# Patient Record
Sex: MALE | Race: WHITE | NOT HISPANIC OR LATINO | Employment: OTHER | URBAN - METROPOLITAN AREA
[De-identification: names, ages, dates, MRNs, and addresses within clinical notes are randomized per-mention and may not be internally consistent; named-entity substitution may affect disease eponyms.]

---

## 2017-06-09 ENCOUNTER — TELEPHONE (OUTPATIENT)
Dept: NEUROLOGY | Facility: HOSPITAL | Age: 57
End: 2017-06-09

## 2017-06-09 DIAGNOSIS — E83.119 HEMOCHROMATOSIS, UNSPECIFIED HEMOCHROMATOSIS TYPE: Primary | ICD-10-CM

## 2017-06-09 NOTE — TELEPHONE ENCOUNTER
----- Message from Corrine Alcazar sent at 6/9/2017  1:41 PM CDT -----  Contact: Patient  Gi- Patient states he has Hemochromatosis and  is long overdue for labs. He would like labs faxed to the Lab raul by [facility]. Patient would like a call when this is done at  [phone].

## 2017-06-09 NOTE — TELEPHONE ENCOUNTER
Pt requesting to have labs done to monitor hemochromatosis and additional labs to check liver because he's been feeling weak lately.  Message sent to Dr Dockery.

## 2017-06-15 ENCOUNTER — TELEPHONE (OUTPATIENT)
Dept: NEUROLOGY | Facility: HOSPITAL | Age: 57
End: 2017-06-15

## 2017-06-15 NOTE — TELEPHONE ENCOUNTER
Patient states he has to have labs done at [facility]. Will have Dr Dockery sign orders.

## 2017-06-15 NOTE — TELEPHONE ENCOUNTER
----- Message from Connie Saravia sent at 6/15/2017  1:11 PM CDT -----  Contact: Patient  GI- Patient needs to speak to Cathy. Patient would like to Cathy to call on his cell phone   [phone].

## 2017-06-19 LAB
EXT 24 HR UR METANEPHRINE: NORMAL
EXT 24 HR UR NORMETANEPHRINE: NORMAL
EXT 24 HR UR NORMETANEPHRINE: NORMAL
EXT 25 HYDROXY VIT D2: NORMAL
EXT 25 HYDROXY VIT D3: NORMAL
EXT 5 HIAA 24 HR URINE: NORMAL
EXT 5 HIAA BLOOD: NORMAL
EXT ACTH: NORMAL
EXT AFP: NORMAL
EXT ALBUMIN: 4.8 G/DL (ref 3.5–5.5)
EXT ALKALINE PHOSPHATASE: 84 IU/L (ref 39–117)
EXT ALT: 21 IU/L (ref 0–44)
EXT AMYLASE: NORMAL
EXT ANTI ISLET CELL AB: NORMAL
EXT ANTI PARIETAL CELL AB: NORMAL
EXT ANTI THYROID AB: NORMAL
EXT AST: 27 IU/L (ref 0–40)
EXT BILIRUBIN DIRECT: NORMAL
EXT BILIRUBIN TOTAL: 0.8 MG/DL (ref 0–1.2)
EXT BK VIRUS DNA QN PCR: NORMAL
EXT BUN: 14 MG/DL (ref 6–24)
EXT C PEPTIDE: NORMAL
EXT CA 125: NORMAL
EXT CA 19-9: NORMAL
EXT CA 27-29: NORMAL
EXT CALCITONIN: NORMAL
EXT CALCIUM: 9.4 MG/DL (ref 8.7–10.2)
EXT CEA: NORMAL
EXT CHLORIDE: 100 MMOL/L (ref 96–106)
EXT CHOLESTEROL: NORMAL
EXT CHROMOGRANIN A: NORMAL
EXT CO2: 24 MMOL/L (ref 18–29)
EXT CREATININE UA: NORMAL
EXT CREATININE: 0.93 MG/DL (ref 0.76–1.27)
EXT CYCLOSPORONE LEVEL: NORMAL
EXT DOPAMINE: NORMAL
EXT EBV DNA BY PCR: NORMAL
EXT EPINEPHRINE: NORMAL
EXT FOLATE: NORMAL
EXT FREE T3: NORMAL
EXT FREE T4: NORMAL
EXT FSH: NORMAL
EXT GASTRIN RELEASING PEPTIDE: NORMAL
EXT GASTRIN RELEASING PEPTIDE: NORMAL
EXT GASTRIN: NORMAL
EXT GGT: NORMAL
EXT GHRELIN: NORMAL
EXT GLUCAGON: NORMAL
EXT GLUCOSE: 99 MG/DL (ref 65–99)
EXT GROWTH HORMONE: NORMAL
EXT HCV RNA QUANT PCR: NORMAL
EXT HDL: NORMAL
EXT HEMATOCRIT: 47.9 % (ref 37.5–51)
EXT HEMOGLOBIN A1C: NORMAL
EXT HEMOGLOBIN: 16.5 G/DL (ref 12.6–17.7)
EXT HISTAMINE 24 HR URINE: NORMAL
EXT HISTAMINE: NORMAL
EXT IGF-1: NORMAL
EXT IMMUNKNOW (NON-STIMULATED): NORMAL
EXT IMMUNKNOW (STIMULATED): NORMAL
EXT INR: NORMAL
EXT INSULIN: NORMAL
EXT LANREOTIDE LEVEL: NORMAL
EXT LDH, TOTAL: NORMAL
EXT LDL CHOLESTEROL: NORMAL
EXT LIPASE: NORMAL
EXT MAGNESIUM: NORMAL
EXT METANEPHRINE FREE PLASMA: NORMAL
EXT MOTILIN: NORMAL
EXT NEUROKININ A CAMB: NORMAL
EXT NEUROKININ A ISI: NORMAL
EXT NEUROTENSIN: NORMAL
EXT NOREPINEPHRINE: NORMAL
EXT NORMETANEPHRINE: NORMAL
EXT NSE: NORMAL
EXT OCTREOTIDE LEVEL: NORMAL
EXT PANCREASTATIN CAMB: NORMAL
EXT PANCREASTATIN ISI: NORMAL
EXT PANCREATIC POLYPEPTIDE: NORMAL
EXT PHOSPHORUS: NORMAL
EXT PLATELETS: 220 X10E3/UL (ref 150–379)
EXT POTASSIUM: 4 MMOL/L (ref 3.5–5.2)
EXT PROGRAF LEVEL: NORMAL
EXT PROLACTIN: NORMAL
EXT PROTEIN TOTAL: 7.2 G/DL (ref 6–8.5)
EXT PROTEIN UA: NORMAL
EXT PT: NORMAL
EXT PTH, INTACT: NORMAL
EXT PTT: NORMAL
EXT RAPAMUNE LEVEL: NORMAL
EXT SEROTONIN: NORMAL
EXT SODIUM: 143 MMOL/L (ref 134–144)
EXT SOMATOSTATIN: NORMAL
EXT SUBSTANCE P: NORMAL
EXT TRIGLYCERIDES: NORMAL
EXT TRYPTASE: NORMAL
EXT TSH: NORMAL
EXT URIC ACID: NORMAL
EXT URINE AMYLASE U/HR: NORMAL
EXT URINE AMYLASE U/L: NORMAL
EXT VASOACTIVE INTESTINAL POLYPEPTIDE: NORMAL
EXT VITAMIN B12: NORMAL
EXT VMA 24 HR URINE: NORMAL
EXT WBC: 6.6 X10E3/UL (ref 3.4–10.8)
NEURON SPECIFIC ENOLASE: NORMAL

## 2017-06-26 ENCOUNTER — TELEPHONE (OUTPATIENT)
Dept: NEUROLOGY | Facility: HOSPITAL | Age: 57
End: 2017-06-26

## 2017-06-26 NOTE — TELEPHONE ENCOUNTER
----- Message from Corrine Alcazar sent at 6/26/2017  1:12 PM CDT -----  Contact: Patient  GI- Patient would like to speak to a nurse in regards to lab results. Patient can be reached at [phone].

## 2017-06-28 ENCOUNTER — TELEPHONE (OUTPATIENT)
Dept: NEUROLOGY | Facility: HOSPITAL | Age: 57
End: 2017-06-28

## 2017-06-28 NOTE — TELEPHONE ENCOUNTER
Pt states he has hemochromatosis, feels fatigued.  Pt request to have orders written for blood transfusion. Pt notified Dr. Dockery to be notified.

## 2017-06-29 ENCOUNTER — TELEPHONE (OUTPATIENT)
Dept: NEUROLOGY | Facility: HOSPITAL | Age: 57
End: 2017-06-29

## 2017-07-10 ENCOUNTER — OFFICE VISIT (OUTPATIENT)
Dept: NEUROLOGY | Facility: HOSPITAL | Age: 57
End: 2017-07-10
Attending: INTERNAL MEDICINE
Payer: COMMERCIAL

## 2017-07-10 VITALS
WEIGHT: 168 LBS | HEIGHT: 68 IN | BODY MASS INDEX: 25.46 KG/M2 | HEART RATE: 85 BPM | TEMPERATURE: 98 F | SYSTOLIC BLOOD PRESSURE: 150 MMHG | RESPIRATION RATE: 16 BRPM | DIASTOLIC BLOOD PRESSURE: 102 MMHG

## 2017-07-10 DIAGNOSIS — E83.119 HEMOCHROMATOSIS, UNSPECIFIED HEMOCHROMATOSIS TYPE: Primary | ICD-10-CM

## 2017-07-10 PROCEDURE — 99214 OFFICE O/P EST MOD 30 MIN: CPT | Performed by: INTERNAL MEDICINE

## 2017-07-10 NOTE — PROGRESS NOTES
"[facility]    CC:  hemochromatosis    HPI 56 y.o. male with a history significant for hemochromatosis, polypectomy (2005) and dysphagia 2/2 Schatzki ring s/p dilation. Patient states he has not gotten phlebotomy since a year ago secondary to stressors. He denies abdominal pain, nausea or vomiting. He denies chest pain or shortness of breat. He has regular bowel movements. He denies difficulty eating solid or liquid foods. He has muscle soreness, however he walks 5-6 miles a day. He denies fatigue or muscle weakness. He had gotten labs at [facility] and ferritin level was elevated.       Past Medical History:   Diagnosis Date    Hemochromatosis     Kidney stone     Urinary tract infection          Review of Systems  General ROS: negative for chills, fever or weight loss  Cardiovascular ROS: no chest pain or dyspnea on exertion      Physical Examination  BP (!) 150/102 Comment: repeat 136 90  Pulse 85   Temp 98 °F (36.7 °C) (Oral)   Resp 16   Ht 5' 8" (1.727 m)   Wt 76.2 kg (168 lb)   BMI 25.54 kg/m²   General appearance: alert, cooperative, no distress  HENT: Normocephalic, atraumatic, neck symmetrical, no nasal discharge   Lungs: clear to auscultation bilaterally, no dullness to percussion bilaterally  Heart: regular rate and rhythm without rub; no displacement of the PMI   Abdomen: soft, non-tender; bowel sounds normoactive; no organomegaly  Extremities: extremities symmetric; no clubbing, cyanosis, or edema    Labs:  H/H (6/2016): 15/42, MCV 94    Imaging:  CXR (6/24): no acute abnormalities     I have personally reviewed the above imaging.     Assessment:   Mr. Harris is a 56 year old gentleman with hemachromatosis     Plan:  Hemochromatosis  - Ferritin level obtained 3 weeks ago, elevated around 400  - Phlebotomy for 2 pints every two weeks for four times with repeat ferritin level afterwards.  - Goal ferritin < 200. PRN phlebotomy for a year to meet goal.     Kevin Dockery MD   [address], [address]   [place] [address]   [phone]

## 2018-06-05 ENCOUNTER — CLINICAL SUPPORT (OUTPATIENT)
Dept: OTHER | Facility: CLINIC | Age: 58
End: 2018-06-05
Payer: COMMERCIAL

## 2018-06-05 VITALS
DIASTOLIC BLOOD PRESSURE: 86 MMHG | WEIGHT: 170 LBS | SYSTOLIC BLOOD PRESSURE: 128 MMHG | HEIGHT: 68 IN | BODY MASS INDEX: 25.76 KG/M2

## 2018-06-05 DIAGNOSIS — Z00.8 HEALTH EXAMINATION IN POPULATION SURVEYS: Primary | ICD-10-CM

## 2018-06-05 LAB
GLUCOSE SERPL-MCNC: ABNORMAL MG/DL (ref 60–140)
POC CHOLESTEROL, HDL: 38 MG/DL (ref 40–?)
POC CHOLESTEROL, LDL: 151 MG/DL (ref ?–160)
POC CHOLESTEROL, TOTAL: 218 MG/DL (ref ?–240)
POC GLUCOSE FASTING: 104 MG/DL (ref 60–110)
POC TOTAL CHOLESTEROL / HDL RATIO: 5.74 (ref ?–6)
POC TRIGLYCERIDES: 145 MG/DL (ref ?–160)

## 2018-06-05 PROCEDURE — 82947 ASSAY GLUCOSE BLOOD QUANT: CPT | Mod: QW,S$GLB,, | Performed by: INTERNAL MEDICINE

## 2018-06-05 PROCEDURE — 99401 PREV MED CNSL INDIV APPRX 15: CPT | Mod: S$GLB,,, | Performed by: INTERNAL MEDICINE

## 2018-06-05 PROCEDURE — 80061 LIPID PANEL: CPT | Mod: QW,S$GLB,, | Performed by: INTERNAL MEDICINE

## 2018-08-16 LAB
EXT 24 HR UR METANEPHRINE: ABNORMAL
EXT 24 HR UR NORMETANEPHRINE: ABNORMAL
EXT 24 HR UR NORMETANEPHRINE: ABNORMAL
EXT 25 HYDROXY VIT D2: ABNORMAL
EXT 25 HYDROXY VIT D3: ABNORMAL
EXT 5 HIAA 24 HR URINE: ABNORMAL
EXT 5 HIAA BLOOD: ABNORMAL
EXT ACTH: ABNORMAL
EXT AFP: ABNORMAL
EXT ALBUMIN: 5 G/DL (ref 3.6–5.1)
EXT ALKALINE PHOSPHATASE: 92 U/L (ref 40–115)
EXT ALT: 22 U/L (ref 9–46)
EXT AMYLASE: ABNORMAL
EXT ANTI ISLET CELL AB: ABNORMAL
EXT ANTI PARIETAL CELL AB: ABNORMAL
EXT ANTI THYROID AB: ABNORMAL
EXT AST: 29 U/L (ref 10–35)
EXT BILIRUBIN DIRECT: ABNORMAL
EXT BILIRUBIN TOTAL: 1.6 MG/DL (ref 0.2–1.2)
EXT BK VIRUS DNA QN PCR: ABNORMAL
EXT BUN: 15 MG/DL (ref 7–25)
EXT C PEPTIDE: ABNORMAL
EXT CA 125: ABNORMAL
EXT CA 19-9: ABNORMAL
EXT CA 27-29: ABNORMAL
EXT CALCITONIN: ABNORMAL
EXT CALCIUM: 9.9 MG/DL (ref 8.6–10.3)
EXT CEA: ABNORMAL
EXT CHLORIDE: 101 MMOL/L (ref 98–110)
EXT CHOLESTEROL: 228 MG/DL (ref 0–200)
EXT CHROMOGRANIN A: ABNORMAL
EXT CO2: 30 MMOL/L (ref 20–32)
EXT CREATININE UA: ABNORMAL
EXT CREATININE: 1.01 MG/DL (ref 0.7–1.33)
EXT CYCLOSPORONE LEVEL: ABNORMAL
EXT DOPAMINE: ABNORMAL
EXT EBV DNA BY PCR: ABNORMAL
EXT EPINEPHRINE: ABNORMAL
EXT FOLATE: ABNORMAL
EXT FREE T3: ABNORMAL
EXT FREE T4: ABNORMAL
EXT FSH: ABNORMAL
EXT GASTRIN RELEASING PEPTIDE: ABNORMAL
EXT GASTRIN RELEASING PEPTIDE: ABNORMAL
EXT GASTRIN: ABNORMAL
EXT GGT: ABNORMAL
EXT GHRELIN: ABNORMAL
EXT GLUCAGON: ABNORMAL
EXT GLUCOSE: 89 MG/DL (ref 65–99)
EXT GROWTH HORMONE: ABNORMAL
EXT HCV RNA QUANT PCR: ABNORMAL
EXT HDL: 47 MG/DL
EXT HEMATOCRIT: 53.3 % (ref 38.5–50)
EXT HEMOGLOBIN A1C: ABNORMAL
EXT HEMOGLOBIN: 18.1 G/DL (ref 13.2–17.1)
EXT HISTAMINE 24 HR URINE: ABNORMAL
EXT HISTAMINE: ABNORMAL
EXT IGF-1: ABNORMAL
EXT IMMUNKNOW (NON-STIMULATED): ABNORMAL
EXT IMMUNKNOW (STIMULATED): ABNORMAL
EXT INR: ABNORMAL
EXT INSULIN: ABNORMAL
EXT LANREOTIDE LEVEL: ABNORMAL
EXT LDH, TOTAL: ABNORMAL
EXT LDL CHOLESTEROL: 149 MG/DL (ref 0–100)
EXT LIPASE: ABNORMAL
EXT MAGNESIUM: ABNORMAL
EXT METANEPHRINE FREE PLASMA: ABNORMAL
EXT MOTILIN: ABNORMAL
EXT NEUROKININ A CAMB: ABNORMAL
EXT NEUROKININ A ISI: ABNORMAL
EXT NEUROTENSIN: ABNORMAL
EXT NOREPINEPHRINE: ABNORMAL
EXT NORMETANEPHRINE: ABNORMAL
EXT NSE: ABNORMAL
EXT OCTREOTIDE LEVEL: ABNORMAL
EXT PANCREASTATIN CAMB: ABNORMAL
EXT PANCREASTATIN ISI: ABNORMAL
EXT PANCREATIC POLYPEPTIDE: ABNORMAL
EXT PHOSPHORUS: ABNORMAL
EXT PLATELETS: 211 1000/UL (ref 140–400)
EXT POTASSIUM: 4.4 MMOL/L (ref 3.5–5.3)
EXT PROGRAF LEVEL: ABNORMAL
EXT PROLACTIN: ABNORMAL
EXT PROTEIN TOTAL: 7.8 G/DL (ref 6.1–8.1)
EXT PROTEIN UA: ABNORMAL
EXT PT: ABNORMAL
EXT PTH, INTACT: ABNORMAL
EXT PTT: ABNORMAL
EXT RAPAMUNE LEVEL: ABNORMAL
EXT SEROTONIN: ABNORMAL
EXT SODIUM: 141 MMOL/L (ref 135–146)
EXT SOMATOSTATIN: ABNORMAL
EXT SUBSTANCE P: ABNORMAL
EXT TRIGLYCERIDES: 186 (ref ?–0)
EXT TRYPTASE: ABNORMAL
EXT TSH: ABNORMAL
EXT URIC ACID: ABNORMAL
EXT URINE AMYLASE U/HR: ABNORMAL
EXT URINE AMYLASE U/L: ABNORMAL
EXT VASOACTIVE INTESTINAL POLYPEPTIDE: ABNORMAL
EXT VITAMIN B12: ABNORMAL
EXT VMA 24 HR URINE: ABNORMAL
EXT WBC: 5.5 1000/UL (ref 3.8–10.8)
NEURON SPECIFIC ENOLASE: ABNORMAL

## 2018-08-20 ENCOUNTER — OFFICE VISIT (OUTPATIENT)
Dept: NEUROLOGY | Facility: HOSPITAL | Age: 58
End: 2018-08-20
Attending: INTERNAL MEDICINE
Payer: COMMERCIAL

## 2018-08-20 VITALS
TEMPERATURE: 99 F | HEART RATE: 86 BPM | BODY MASS INDEX: 25.27 KG/M2 | DIASTOLIC BLOOD PRESSURE: 82 MMHG | HEIGHT: 69 IN | WEIGHT: 170.63 LBS | SYSTOLIC BLOOD PRESSURE: 132 MMHG

## 2018-08-20 DIAGNOSIS — E83.119 HEMOCHROMATOSIS, UNSPECIFIED HEMOCHROMATOSIS TYPE: Primary | ICD-10-CM

## 2018-08-20 PROCEDURE — 99214 OFFICE O/P EST MOD 30 MIN: CPT | Performed by: INTERNAL MEDICINE

## 2018-08-20 RX ORDER — ASPIRIN 81 MG/1
81 TABLET ORAL DAILY
COMMUNITY

## 2018-08-20 NOTE — PATIENT INSTRUCTIONS
Colonoscopy scheduled on Friday, December 7, 2018 at [facility].  Staff from this center will contact you with necessary information.

## 2018-08-20 NOTE — PROGRESS NOTES
"[facility]    CC:  hemochromatosis     HPI 57 y.o. male with a history significant for hemochromatosis, colon polyps and dysphagia 2/2 Schatzki ring s/p dilation who presents for follow up of several years of chronic, persistent hemochromatosis without requiring serial phlebotomy sessions..     Patient has not been seen in approximately one year. He is due for colonoscopy due to history of colon polyps in the past. Last colonoscopy was performed in 2011.    Past Medical History:   Diagnosis Date    Hemochromatosis     Kidney stone     Urinary tract infection      Review of Systems  General ROS: negative for chills, fever or weight loss  Cardiovascular ROS: no chest pain or dyspnea on exertion  Gastrointestinal ROS: no abdominal pain, change in bowel habits, or black/ bloody stools    Physical Examination  /82 (BP Location: Right leg, Patient Position: Sitting, BP Method: Small (Automatic))   Pulse 86   Temp 98.7 °F (37.1 °C) (Oral)   Ht 5' 9" (1.753 m)   Wt 77.4 kg (170 lb 10.2 oz)   BMI 25.20 kg/m²   General appearance: alert, cooperative, no distress  Abdomen: soft, non-tender; bowel sounds normoactive; no organomegaly    Labs:  Last ferritin noted in 2009    Imaging:  NM myocardial perfusion study:  No evidence of reversible ischemia  LVEF 61%    Assessment:   56 y.o. male with a history significant for hemochromatosis, colon polyps and dysphagia 2/2 Schatzki ring s/p dilation who is here for follow up of hemochromatosis. If ferritin is elevated above 200, he will need phlebotomy prescribed as 2 units every month x 4 months He is in need of surveillance colonoscopy given his history of colon polyps and colonoscopy last in 2011.     Plan:  -Goal ferritin < 200.   -Plan surveillance colonoscopy given history of colon polyps. This exam should be covered under his preventive care benefits. I will plan to perform this exam at the [facility] endoscopy center [place] on December 7th around 12noon "         Kevin Dockery MD   [address], [address]   [place] [address]   [phone]

## 2018-08-31 ENCOUNTER — TELEPHONE (OUTPATIENT)
Dept: NEUROLOGY | Facility: HOSPITAL | Age: 58
End: 2018-08-31

## 2018-08-31 NOTE — TELEPHONE ENCOUNTER
Pt notified he will be contacted upon receipt of hct threshold range.  Pt acknowledged understanding.

## 2018-08-31 NOTE — TELEPHONE ENCOUNTER
----- Message from Connie Saravia sent at 8/31/2018  2:25 PM CDT -----  Contact: patient  GI- Patient called regarding his lab work that He dropped off. Call back number is [phone]

## 2018-09-05 ENCOUNTER — TELEPHONE (OUTPATIENT)
Dept: NEUROLOGY | Facility: HOSPITAL | Age: 58
End: 2018-09-05

## 2018-09-05 NOTE — TELEPHONE ENCOUNTER
----- Message from Kevin Dockery MD sent at 9/5/2018  9:14 AM CDT -----  Strange. I guess tell them the range is 36 to 55% Hct     ----- Message -----  From: Arcelia Hunt LPN  Sent: 8/31/2018   2:35 PM  To: Kevin Dockery MD    Lab requesting hct threshold range for phlebotomy.

## 2018-11-14 ENCOUNTER — TELEPHONE (OUTPATIENT)
Dept: NEUROLOGY | Facility: HOSPITAL | Age: 58
End: 2018-11-14

## 2018-11-14 NOTE — TELEPHONE ENCOUNTER
Pt notified colonoscopy scheduled on Friday, December 7, 2018 at [facility] in [place] with 630 arrival time. Pt has family medical issues and will be going out of town for a couple of weeks.  Pt will call clinic to reschedule in January 2019.

## 2018-11-14 NOTE — TELEPHONE ENCOUNTER
Spring notified pt canceled on 12/7/18 and will contact this clinic to reschedule in January 2019.  Spring acknowledged understanding.

## 2018-11-14 NOTE — TELEPHONE ENCOUNTER
----- Message from Corrine Alcazar sent at 11/14/2018 10:38 AM CST -----  Contact: Spring with Kim RAY  GI- Spring RAY called to inform the nurse that the patients Procedure will be on 12/7/18  And the time was moved to 7:30 am report time will be 7:00 am. Spring would like the nurse to call the patient to inform of changes. Spring can be reached at [phone], if needed.

## 2018-11-29 DIAGNOSIS — N63.0 BREAST MASS IN MALE: Primary | ICD-10-CM

## 2019-01-22 DIAGNOSIS — N63.0 BREAST MASS IN MALE: Primary | ICD-10-CM

## 2019-01-29 DIAGNOSIS — N63.0 BREAST MASS: Primary | ICD-10-CM

## 2019-05-14 ENCOUNTER — CLINICAL SUPPORT (OUTPATIENT)
Dept: OTHER | Facility: CLINIC | Age: 59
End: 2019-05-14
Payer: COMMERCIAL

## 2019-05-14 DIAGNOSIS — Z00.8 ENCOUNTER FOR OTHER GENERAL EXAMINATION: ICD-10-CM

## 2019-05-14 PROCEDURE — 82947 PR  ASSAY QUANTITATIVE,BLOOD GLUCOSE: ICD-10-PCS | Mod: QW,S$GLB,, | Performed by: INTERNAL MEDICINE

## 2019-05-14 PROCEDURE — 82947 ASSAY GLUCOSE BLOOD QUANT: CPT | Mod: QW,S$GLB,, | Performed by: INTERNAL MEDICINE

## 2019-05-14 PROCEDURE — 99401 PREV MED CNSL INDIV APPRX 15: CPT | Mod: S$GLB,,, | Performed by: INTERNAL MEDICINE

## 2019-05-14 PROCEDURE — 80061 LIPID PANEL: CPT | Mod: QW,S$GLB,, | Performed by: INTERNAL MEDICINE

## 2019-05-14 PROCEDURE — 80061 PR  LIPID PANEL: ICD-10-PCS | Mod: QW,S$GLB,, | Performed by: INTERNAL MEDICINE

## 2019-05-14 PROCEDURE — 99401 PR PREVENT COUNSEL,INDIV,15 MIN: ICD-10-PCS | Mod: S$GLB,,, | Performed by: INTERNAL MEDICINE

## 2019-05-15 NOTE — PROGRESS NOTES
No H/O HTN. Under a lot stress--mother fell and needs surgery. ELIZABETH Cavazos  [facility] he does yearly with.  next in july, and states he sees drs for other disease processes, too.  will bring results with him.  will recheck bp today.  pt states typically 140/90, and dr is aware.  pt doesnt want to take bp meds.  ELIZABETH Mayes

## 2019-05-16 VITALS — HEIGHT: 69 IN | BODY MASS INDEX: 25.2 KG/M2

## 2019-05-16 LAB
HDLC SERPL-MCNC: 48 MG/DL
POC CHOLESTEROL, LDL (DOCK): 207 MG/DL
POC CHOLESTEROL, TOTAL: 287 MG/DL
POC GLUCOSE, FASTING: 94 MG/DL (ref 60–110)
TRIGL SERPL-MCNC: 158 MG/DL

## 2019-05-20 ENCOUNTER — TELEPHONE (OUTPATIENT)
Dept: OTHER | Facility: CLINIC | Age: 59
End: 2019-05-20

## 2019-05-20 NOTE — TELEPHONE ENCOUNTER
Spoke with Mr Harris. He states he has been monitoring his BP and has been around 140/90. Discussed that 140/90 is still elevated. States he has appointment with his PCP for the end of June. Encouraged him to keep monitoring BP and keep log to bring with him to appointment. Discussed that if the diastolic BP is greater than 100 again to notify PCP. States he has been feeling well.

## 2019-06-12 ENCOUNTER — OFFICE VISIT (OUTPATIENT)
Dept: NEUROLOGY | Facility: HOSPITAL | Age: 59
End: 2019-06-12
Attending: INTERNAL MEDICINE
Payer: COMMERCIAL

## 2019-06-12 VITALS
TEMPERATURE: 100 F | WEIGHT: 174.94 LBS | HEART RATE: 79 BPM | HEIGHT: 69 IN | DIASTOLIC BLOOD PRESSURE: 79 MMHG | SYSTOLIC BLOOD PRESSURE: 153 MMHG | BODY MASS INDEX: 25.91 KG/M2

## 2019-06-12 DIAGNOSIS — E83.119 HEMOCHROMATOSIS, UNSPECIFIED HEMOCHROMATOSIS TYPE: Primary | ICD-10-CM

## 2019-06-12 PROCEDURE — 99213 OFFICE O/P EST LOW 20 MIN: CPT | Performed by: INTERNAL MEDICINE

## 2019-06-12 RX ORDER — NYSTATIN 100000 [USP'U]/ML
6 SUSPENSION ORAL 4 TIMES DAILY
Qty: 240 ML | Refills: 0 | Status: SHIPPED | OUTPATIENT
Start: 2019-06-12 | End: 2019-06-22

## 2019-06-12 RX ORDER — CLARITHROMYCIN 500 MG/1
TABLET, FILM COATED ORAL
Refills: 0 | COMMUNITY
Start: 2019-04-08 | End: 2019-06-12

## 2019-06-12 NOTE — PROGRESS NOTES
"CC:  hemochromatosis     HPI 57 y.o. male with a history significant for hemochromatosis, colon polyps and dysphagia 2/2 Schatzki ring s/p dilation who presents for follow up of several years of chronic, persistent hemochromatosis without requiring serial phlebotomy sessions.. Patient has not been seen in approximately one year. He is due for colonoscopy due to history of colon polyps in the past. Last colonoscopy was performed in 2011          Past Medical History:   Diagnosis Date    Hemochromatosis      Kidney stone      Urinary tract infection        Review of Systems  General ROS: negative for chills, fever or weight loss  Cardiovascular ROS: no chest pain or dyspnea on exertion  Gastrointestinal ROS: no abdominal pain, change in bowel habits, or black/ bloody stools     Physical Examination  /82 (BP Location: Right leg, Patient Position: Sitting, BP Method: Small (Automatic))   Pulse 86   Temp 98.7 °F (37.1 °C) (Oral)   Ht 5' 9" (1.753 m)   Wt 77.4 kg (170 lb 10.2 oz)   BMI 25.20 kg/m²   General appearance: alert, cooperative, no distress  Abdomen: soft, non-tender; bowel sounds normoactive; no organomegaly     Labs:  Recent ferritin is 27        Assessment:   56 y.o. male with a history significant for hemochromatosis, colon polyps and dysphagia 2/2 Schatzki ring s/p dilation who is here for follow up of hemochromatosis. His ferritin is less than 100 so he doesn't need any additional phlebotomy at this time. He is in need of surveillance colonoscopy given his history of colon polyps and colonoscopy last in 2011.      Plan:  Plan surveillance colonoscopy given history of colon polyps. This exam should be covered under his preventive care benefits. I will plan to perform this exam at the [facility] endoscopy center [facility] on July 16th at 12noon  Nystatin swish and swallow for potential thrush (has already seen ENT without a diagnosis)        Kevin Dockery MD   [address], [address]   [place] " [address]   [phone]

## 2019-09-05 ENCOUNTER — OFFICE VISIT (OUTPATIENT)
Dept: UROLOGY | Facility: CLINIC | Age: 59
End: 2019-09-05
Payer: COMMERCIAL

## 2019-09-05 VITALS
HEIGHT: 69 IN | SYSTOLIC BLOOD PRESSURE: 143 MMHG | WEIGHT: 174.81 LBS | BODY MASS INDEX: 25.89 KG/M2 | HEART RATE: 77 BPM | DIASTOLIC BLOOD PRESSURE: 97 MMHG

## 2019-09-05 DIAGNOSIS — N41.9 PROSTATITIS, UNSPECIFIED PROSTATITIS TYPE: ICD-10-CM

## 2019-09-05 DIAGNOSIS — N40.0 ENLARGED PROSTATE: Primary | ICD-10-CM

## 2019-09-05 PROCEDURE — 3008F BODY MASS INDEX DOCD: CPT | Mod: CPTII,S$GLB,, | Performed by: UROLOGY

## 2019-09-05 PROCEDURE — 99214 OFFICE O/P EST MOD 30 MIN: CPT | Mod: S$GLB,,, | Performed by: UROLOGY

## 2019-09-05 PROCEDURE — 3008F PR BODY MASS INDEX (BMI) DOCUMENTED: ICD-10-PCS | Mod: CPTII,S$GLB,, | Performed by: UROLOGY

## 2019-09-05 PROCEDURE — 99214 PR OFFICE/OUTPT VISIT, EST, LEVL IV, 30-39 MIN: ICD-10-PCS | Mod: S$GLB,,, | Performed by: UROLOGY

## 2019-09-05 RX ORDER — OMEPRAZOLE 40 MG/1
CAPSULE, DELAYED RELEASE ORAL
Refills: 3 | COMMUNITY
Start: 2019-07-22

## 2019-09-05 NOTE — PROGRESS NOTES
"Subjective:      Owen Harris is a 58 y.o. male who returns today regarding his     Enlarged prostate without urinary symptoms.  History of prostatitis no recent complaints.  Doing well no  complaints at this time.    The following portions of the patient's history were reviewed and updated as appropriate: allergies, current medications, past family history, past medical history, past social history, past surgical history and problem list.    Review of Systems  Pertinent items are noted in HPI.  A comprehensive multipoint review of systems was negative except as otherwise stated in the HPI.     Objective:   Vitals: BP (!) 143/97 (BP Location: Left arm, Patient Position: Sitting, BP Method: Large (Automatic))   Pulse 77   Ht 5' 9" (1.753 m)   Wt 79.3 kg (174 lb 13.2 oz)   BMI 25.82 kg/m²     Physical Exam   General: alert and oriented, no acute distress  Respiratory: Symmetric expansion, non-labored breathing  Cardiovascular: normal to inspection  Abdomen: non distended   Skin: normal coloration and turgor, no rashes, no suspicious skin lesions noted  Neuro: no gross deficits  Psych: normal judgment and insight, normal mood/affect and non-anxious    Physical Exam    Lab Review   Urinalysis demonstrates negative for all components  Lab Results   Component Value Date    WBC 6.47 06/24/2016    HGB 15.1 06/24/2016    HCT 42.9 06/24/2016    MCV 94 06/24/2016     06/24/2016     Lab Results   Component Value Date    CREATININE 1.2 06/24/2016    BUN 16 06/24/2016       PSA 1.3 no previous PSAs available at this time    Imaging  -  Assessment and Plan:   Enlarged prostate without lower urinary tract symptoms    History of Prostatitis, resolved    We discussed the risks and benefits of PSA screening and the patient wishes to continue checking yearly PSA.  Return to clinic 1 year with PSA for repeat prostate exam    He will have his PSA drawn by his primary physician and bring us a paper copy of the results    "

## 2021-05-03 ENCOUNTER — TELEPHONE (OUTPATIENT)
Dept: NEUROLOGY | Facility: HOSPITAL | Age: 61
End: 2021-05-03

## 2023-02-06 ENCOUNTER — TELEPHONE (OUTPATIENT)
Dept: GASTROENTEROLOGY | Facility: CLINIC | Age: 63
End: 2023-02-06
Payer: COMMERCIAL

## 2023-02-06 NOTE — TELEPHONE ENCOUNTER
Low iron levels, clinic appt scheduled on Wednesday, February 8, 2023 at 11am.  Pt repeated date and time correctly.

## 2023-02-06 NOTE — TELEPHONE ENCOUNTER
----- Message from Tamika Mtz sent at 2/6/2023 11:40 AM CST -----  Regarding: appointment  Contact: [phone]  Patient is requesting a call back regarding scheduling an appointment.   Would the patient rather a call back or a response via [facility]?  Call   Best Call Back Number:  [phone]  Additional Information:  follow up on critical iron levels

## 2023-02-08 ENCOUNTER — OFFICE VISIT (OUTPATIENT)
Dept: GASTROENTEROLOGY | Facility: CLINIC | Age: 63
End: 2023-02-08
Payer: COMMERCIAL

## 2023-02-08 ENCOUNTER — TELEPHONE (OUTPATIENT)
Dept: GASTROENTEROLOGY | Facility: CLINIC | Age: 63
End: 2023-02-08

## 2023-02-08 VITALS
DIASTOLIC BLOOD PRESSURE: 82 MMHG | HEART RATE: 88 BPM | TEMPERATURE: 99 F | HEIGHT: 69 IN | WEIGHT: 181.56 LBS | SYSTOLIC BLOOD PRESSURE: 168 MMHG | BODY MASS INDEX: 26.89 KG/M2

## 2023-02-08 DIAGNOSIS — E83.110 HEREDITARY HEMOCHROMATOSIS: Primary | ICD-10-CM

## 2023-02-08 RX ORDER — ALPRAZOLAM 1 MG/1
1 TABLET ORAL 2 TIMES DAILY
COMMUNITY

## 2023-02-08 NOTE — PROGRESS NOTES
"CC:  hemochromatosis     HPI 57 y.o. male with a history significant for hemochromatosis and dysphagia 2/2 Schatzki ring s/p dilation who presents for follow up. He feels well but was found to have elevated iron stores on most recent lab evaluation. He has been successfully managed with therapeutic phlebotomy in the past to bring his iron stores within normal limits.              Past Medical History:   Diagnosis Date    Hemochromatosis      Kidney stone      Urinary tract infection       Physical Examination  BP (!) 168/82 (BP Location: Left arm, Patient Position: Sitting, BP Method: Medium (Automatic))   Pulse 88   Temp 98.7 °F (37.1 °C) (Oral)   Ht 5' 9" (1.753 m)   Wt 82.3 kg (181 lb 8.8 oz)   BMI 26.81 kg/m²     Abdomen: soft, non-tender; bowel sounds normoactive; no organomegaly     1/24/2023   AST/ALT 20/26   Hct elevated at 51%   Ferritin elevated at 531 ng/ml  Iron sat >93%         Assessment:   56 y.o. male with a history significant for hemochromatosis and dysphagia 2/2 Schatzki ring s/p dilation who is here for follow up of hemochromatosis. His iron studies now indicate recurrent iron overload which requires treatment. Primary therapy for hemochromatosis includes phlebotomy.      Plan:  Therapeutic phlebotomy with [facility]   Diagnosis is hereditary hemochromatosis   The goal ferritin is less than 100 ng/ml   Plan phlebotomy of two pints of whole blood every 2 weeks x 8 sessions. Check Hematocrit after 4 sessions. If hematocrit is less than 32%, hold phlebotomy and contact my office for full lab draw   Plan repeat CBC, iron, TIBC, ferritin in 4 months through my clinic then I will call with results and plans for future treatment     Repeat colonoscopy in 2028      Kevin Dockery MD   [address], [address]   [place] [address] [phone]    "

## 2024-05-30 ENCOUNTER — CLINICAL SUPPORT (OUTPATIENT)
Dept: OTHER | Facility: CLINIC | Age: 64
End: 2024-05-30
Payer: COMMERCIAL

## 2024-05-30 DIAGNOSIS — Z00.8 ENCOUNTER FOR OTHER GENERAL EXAMINATION: ICD-10-CM

## 2024-06-04 VITALS
HEIGHT: 68 IN | BODY MASS INDEX: 26.83 KG/M2 | SYSTOLIC BLOOD PRESSURE: 154 MMHG | WEIGHT: 177 LBS | DIASTOLIC BLOOD PRESSURE: 94 MMHG

## 2024-06-04 LAB
GLUCOSE SERPL-MCNC: 101 MG/DL (ref 60–140)
HDLC SERPL-MCNC: 36 MG/DL
POC CHOLESTEROL, LDL (DOCK): 133 MG/DL
POC CHOLESTEROL, TOTAL: 219 MG/DL
TRIGL SERPL-MCNC: 278 MG/DL

## 2025-06-02 ENCOUNTER — CLINICAL SUPPORT (OUTPATIENT)
Dept: OTHER | Facility: CLINIC | Age: 65
End: 2025-06-02
Payer: COMMERCIAL

## 2025-06-02 DIAGNOSIS — Z00.8 ENCOUNTER FOR OTHER GENERAL EXAMINATION: ICD-10-CM

## 2025-06-03 VITALS
DIASTOLIC BLOOD PRESSURE: 99 MMHG | WEIGHT: 177 LBS | BODY MASS INDEX: 26.83 KG/M2 | SYSTOLIC BLOOD PRESSURE: 157 MMHG | HEIGHT: 68 IN

## 2025-06-03 LAB
HDLC SERPL-MCNC: 35 MG/DL
POC CHOLESTEROL, LDL (DOCK): 144 MG/DL
POC CHOLESTEROL, TOTAL: 211 MG/DL
POC GLUCOSE, FASTING: 107 MG/DL (ref 60–110)
TRIGL SERPL-MCNC: 176 MG/DL

## 2025-06-07 ENCOUNTER — RESULTS FOLLOW-UP (OUTPATIENT)
Dept: OTHER | Facility: CLINIC | Age: 65
End: 2025-06-07